# Patient Record
Sex: MALE | Race: WHITE
[De-identification: names, ages, dates, MRNs, and addresses within clinical notes are randomized per-mention and may not be internally consistent; named-entity substitution may affect disease eponyms.]

---

## 2019-06-04 ENCOUNTER — HOSPITAL ENCOUNTER (EMERGENCY)
Dept: HOSPITAL 25 - ED | Age: 29
LOS: 1 days | Discharge: HOME | End: 2019-06-05
Payer: COMMERCIAL

## 2019-06-04 VITALS — DIASTOLIC BLOOD PRESSURE: 64 MMHG | SYSTOLIC BLOOD PRESSURE: 117 MMHG

## 2019-06-04 DIAGNOSIS — N40.0: ICD-10-CM

## 2019-06-04 DIAGNOSIS — R50.9: ICD-10-CM

## 2019-06-04 DIAGNOSIS — N41.0: Primary | ICD-10-CM

## 2019-06-04 DIAGNOSIS — Z79.899: ICD-10-CM

## 2019-06-04 LAB
ALBUMIN SERPL BCG-MCNC: 4.7 G/DL (ref 3.2–5.2)
ALBUMIN/GLOB SERPL: 1.4 {RATIO} (ref 1–3)
ALP SERPL-CCNC: 55 U/L (ref 34–104)
ALT SERPL W P-5'-P-CCNC: 15 U/L (ref 7–52)
ANION GAP SERPL CALC-SCNC: 13 MMOL/L (ref 2–11)
APTT PPP: 32.6 SECONDS (ref 26–38)
AST SERPL-CCNC: 14 U/L (ref 13–39)
BASOPHILS # BLD AUTO: 0 10^3/UL (ref 0–0.2)
BUN SERPL-MCNC: 12 MG/DL (ref 6–24)
BUN/CREAT SERPL: 13.2 (ref 8–20)
CALCIUM SERPL-MCNC: 10.1 MG/DL (ref 8.6–10.3)
CHLORIDE SERPL-SCNC: 102 MMOL/L (ref 101–111)
CK SERPL-CCNC: 45 U/L (ref 10–223)
EOSINOPHIL # BLD AUTO: 0 10^3/UL (ref 0–0.6)
GLOBULIN SER CALC-MCNC: 3.3 G/DL (ref 2–4)
GLUCOSE SERPL-MCNC: 91 MG/DL (ref 70–100)
HCO3 SERPL-SCNC: 21 MMOL/L (ref 22–32)
HCT VFR BLD AUTO: 46 % (ref 42–52)
HGB BLD-MCNC: 16.1 G/DL (ref 14–18)
INR PPP/BLD: 1.12 (ref 0.82–1.09)
LYMPHOCYTES # BLD AUTO: 1.1 10^3/UL (ref 1–4.8)
MCH RBC QN AUTO: 31 PG (ref 27–31)
MCHC RBC AUTO-ENTMCNC: 35 G/DL (ref 31–36)
MCV RBC AUTO: 90 FL (ref 80–94)
MONOCYTES # BLD AUTO: 1.8 10^3/UL (ref 0–0.8)
NEUTROPHILS # BLD AUTO: 13.9 10^3/UL (ref 1.5–7.7)
NRBC # BLD AUTO: 0 10^3/UL
NRBC BLD QL AUTO: 0
PLATELET # BLD AUTO: 163 10^3/UL (ref 150–450)
POTASSIUM SERPL-SCNC: 3.4 MMOL/L (ref 3.5–5)
PROT SERPL-MCNC: 8 G/DL (ref 6.4–8.9)
RBC # BLD AUTO: 5.16 10^6 /UL (ref 4.18–5.48)
SODIUM SERPL-SCNC: 136 MMOL/L (ref 135–145)
WBC # BLD AUTO: 16.8 10^3/UL (ref 3.5–10.8)
WBC UR QL AUTO: (no result)

## 2019-06-04 PROCEDURE — 87040 BLOOD CULTURE FOR BACTERIA: CPT

## 2019-06-04 PROCEDURE — 96361 HYDRATE IV INFUSION ADD-ON: CPT

## 2019-06-04 PROCEDURE — 96365 THER/PROPH/DIAG IV INF INIT: CPT

## 2019-06-04 PROCEDURE — 85610 PROTHROMBIN TIME: CPT

## 2019-06-04 PROCEDURE — 82550 ASSAY OF CK (CPK): CPT

## 2019-06-04 PROCEDURE — 76870 US EXAM SCROTUM: CPT

## 2019-06-04 PROCEDURE — 96375 TX/PRO/DX INJ NEW DRUG ADDON: CPT

## 2019-06-04 PROCEDURE — 86140 C-REACTIVE PROTEIN: CPT

## 2019-06-04 PROCEDURE — 85730 THROMBOPLASTIN TIME PARTIAL: CPT

## 2019-06-04 PROCEDURE — 80053 COMPREHEN METABOLIC PANEL: CPT

## 2019-06-04 PROCEDURE — 85652 RBC SED RATE AUTOMATED: CPT

## 2019-06-04 PROCEDURE — 96367 TX/PROPH/DG ADDL SEQ IV INF: CPT

## 2019-06-04 PROCEDURE — 81003 URINALYSIS AUTO W/O SCOPE: CPT

## 2019-06-04 PROCEDURE — 76770 US EXAM ABDO BACK WALL COMP: CPT

## 2019-06-04 PROCEDURE — 81015 MICROSCOPIC EXAM OF URINE: CPT

## 2019-06-04 PROCEDURE — 83605 ASSAY OF LACTIC ACID: CPT

## 2019-06-04 PROCEDURE — 99284 EMERGENCY DEPT VISIT MOD MDM: CPT

## 2019-06-04 PROCEDURE — 36415 COLL VENOUS BLD VENIPUNCTURE: CPT

## 2019-06-04 PROCEDURE — 85025 COMPLETE CBC W/AUTO DIFF WBC: CPT

## 2019-06-04 PROCEDURE — 96366 THER/PROPH/DIAG IV INF ADDON: CPT

## 2019-06-04 PROCEDURE — 87086 URINE CULTURE/COLONY COUNT: CPT

## 2019-06-04 NOTE — ED
Progress





- Progress Note


Progress Note: 


Pt is a sign out to Dr. Shelby from Dr. Lopez on the night shift 7pm - 7am on 6 /4/19 - 6/5/19 pending observation with IV antibiotics. Pt will have an ABX 

change from Macrobid to Cipro or Bactrim with a Dx of prostatitis and 

instructions to follow up with Dr. Salazar at 8 am 6/5/19. 








Re-Evaluation





- Re-Evaluation


  ** First Eval


Re-Evaluation Time: 22:55


Change: Unchanged


Comment: Low back pain is controlled except reports new right testicle pain. Pt 

feels warm, but repeat temp is still 99 temporal.  Abd soft, nontender. Mother 

with pt, given results of ultrasound, and advised of Dr. Salazar's consult advice.





Course/Dx





- Course


Course Of Treatment: The pt was a sign out from Dr. Lopez to Dr. Shelby due to 

a pending testicular US. The Pt received a testicular US which showed the right 

epididymis appears grossly unremarkable but there is slight hypervascularity in 

the right epididymal head, cannot exclude mild right epididymitis. The pt will 

be discharged home with the following Dx: Prostatitis, Flank pain, and enlarged 

prostate. The pt will be informed to follow up with his PCP within 3 days and 

to return to the ED with any new or worsening symptoms.





- Diagnoses


Provider Diagnoses: 


 Enlarged prostate, Flank pain, Prostatitis, acute, Fever








Discharge





- Sign-Out/Discharge


Documenting (check all that apply): Patient Departure - discharge, Receiving 

Sign-Out


Receiving patient FROM: Dory Lopez


Patient Received Moderate/Deep Sedation with Procedure: No





- Discharge Plan


Condition: Stable


Disposition: HOME


Prescriptions: 


Sulfamethox/Trimethoprim DS* [Bactrim /160 TAB*] 1 tab PO BID #20 tab


Patient Education Materials:  Prostatitis (ED), Flank Pain (ED)


Referrals: 


Josh Salazar MD [Medical Doctor] - 1 Day


Additional Instructions: 


RETURN TO THE EMERGENCY ROOM WITH ANY NEW OR WORSENING SYMPTOMS. PLEASE CALL 

DR. SALAZAR TOMORROW AT 8 AM TO SCHEDULE AN APPOINTMENT. 





- Billing Disposition and Condition


Condition: STABLE


Disposition: Home





- Attestation Statements


Document Initiated by Scribe: Yes


Documenting Scribe: Jose David Orr


Provider For Whom Scribe is Documenting (Include Credential): Travis Shelby MD


Scribe Attestation: 


I, Jose David Orr, scribed for Travis Shelby MD on 06/05/19 at 0510. 


Scribe Documentation Reviewed: Yes


Provider Attestation: 


The documentation as recorded by the Jose David mccormick accurately reflects 

the service I personally performed and the decisions made by me, Travis Shelby MD


Status of Scribe Document: Viewed

## 2019-06-04 NOTE — ED
Back Pain





- HPI Summary


HPI Summary: 





Pt is a 28 y/o M presenting to the ED with a chief complaint of back pain on 

both sides of his back. He states he came down with a fever on 5/30/19, and 

then the next day developed into feeling very fatigued. On 6/1 and 6/2, he had 

some difficulty urinating, described as burning and some pain, accompanied by 

dizziness and his fever. The past couple of months, he reports he has been sick 

around 4-5 times. On 6/3, he went to [facility] and received abx to treat a 

UTI, (macrobid) took one dose on the evening of 6/3 and one dose in the morning 

of 6/4, but reports that he feels much worse today and that the bilateral flank/

low back pain is new today compared to yesterday. He also took 400mg ibuprofen 

this morning around 0800 with some relief. 


He reports headache, fever, chills, photophobia, diaphoresis, burning with 

urination, and dysuria. He denies hematuria, cough, phlegm, dizziness, ear ache

, sore throat, or rash. He denies hx of kidney stones, but it is in his FHx. He 

also denies one-sided back pain or hx of UTI.








- History of Current Complaint


Chief Complaint: EDFlankPain


Stated Complaint: POSSIBLE UTI PER [facility]


Time Seen by Provider: 06/04/19 16:46


Hx Obtained From: Patient, Family/Caretaker - mother with pt


Onset/Duration: Gradual Onset, Lasting Days, Still Present


Onset/Duration: Started Days Ago, Still Present


Timing: Constant, Lasting Days


Back Pain Location: Is Diffuse - bilateral low back and bilateral flank pain


Severity Initially: Mild


Severity Currently: Moderate


Pain Intensity: 3


Pain Scale Used: 0-10 Numeric


Character: Aching


Aggravating Symptom(s): Nothing


Alleviating Symptom(s): Nothing


Associated Signs And Symptoms: Positive: Fever, Other - dysuria





- Allergies/Home Medications


Allergies/Adverse Reactions: 


 Allergies











Allergy/AdvReac Type Severity Reaction Status Date / Time


 


No Known Allergies Allergy   Verified 06/04/19 14:56











Home Medications: 


 Home Medications





Nitrofurantoin Macrocrystals* [Macrodantin 100 mg*] 100 mg PO BID 06/04/19 [

History Confirmed 06/04/19]











PMH/Surg Hx/FS Hx/Imm Hx


Previously Healthy: Yes


Endocrine/Hematology History: 


   Denies: Hx Diabetes


 History: 


   Denies: Hx Kidney Infection, Hx Kidney Stones





- Surgical History


Surgery Procedure, Year, and Place: none


Infectious Disease History: No


Infectious Disease History: 


   Denies: Traveled Outside the [place] in Last 30 Days





- Family History


Known Family History: Positive: Other - kidney stones





- Social History


Occupation: Student


Alcohol Use: None


Hx Substance Use: No


Substance Use Type: Reports: None


Hx Tobacco Use: No





Review of Systems


Positive: Fever, Chills, Skin Diaphoresis


Positive: Photophobia


Negative: Sore Throat, Ear Ache


Cardiovascular: Negative


Negative: Cough


Gastrointestinal: Negative


Positive: burning, dysuria.  Negative: hematuria


Positive: Myalgia - back pain.  Negative: Other - one-sided back pain; the back 

pain he has is bilateral


Negative: Rash


Neurological: Negative - dizziness


Positive: Headache


Psychological: Normal


All Other Systems Reviewed And Are Negative: Yes





Physical Exam





- Summary


Physical Exam Summary: 





Appearance: Ill-appearing, moderate pain distress, well-nourished


Skin: Warm, color reflects adequate perfusion, diaphoretic


Head: Normal Head/Face inspection, atraumatic


Eyes: Conjunctiva clear


ENT: Normal inspection


Neck: Supple, no nodes, no JVD


Respiratory: Lungs clear, normal breath sounds, no respiratory distress


Cardio: RRR, No murmur, pulses normal, brisk capillary refill


Abdomen: Soft, nontender


: with nurse Leonard as chaperone: uncircumcised male, right testicle tender, 

no masses, no redness, left testicle nontender


Bowel sounds: Present 


Musculoskeletal: Strength Intact/ROM intact, no calf tenderness, no edema.


Psychological: Normal


Neuro: Alert, muscle tone normal, no focal deficit





Triage Information Reviewed: Yes


Vital Signs On Initial Exam: 


 Initial Vitals











Temp Pulse Resp BP Pulse Ox


 


 100.9 F   103   16   140/82   99 


 


 06/04/19 14:52  06/04/19 14:52  06/04/19 14:52  06/04/19 14:52  06/04/19 14:52











Vital Signs Reviewed: Yes





Diagnostics





- Vital Signs


 Vital Signs











  Temp Pulse Resp BP Pulse Ox


 


 06/04/19 16:40  100.1 F  96  20  121/69  100


 


 06/04/19 14:52  100.9 F  103  16  140/82  99














- Laboratory


Result Diagrams: 


 06/04/19 17:38





 06/04/19 17:38


Lab Statement: Any lab studies that have been ordered have been reviewed, and 

results considered in the medical decision making process.





- Ultrasound


  ** No standard instances


Ultrasound Interpretation Completed By: Radiologist


Summary of Ultrasound Findings: Renal US shows:  1. The prostate gland is 

enlarged measuring 57.5 cc. 2. No renal calculi or hydronephrosis.  ED 

physician has reviewed this report.





Re-Evaluation





- Re-Evaluation


  ** First Eval


Change: Unchanged


Comment: Low back pain is controlled except reports new right testicle pain. Pt 

feels warm, but repeat temp is still 99 temporal.  Abd soft, nontender. Mother 

with pt, given results of ultrasound, and advised of Dr. Salazar's consult advice.





Back Pain Course/Dx





- Course


Course Of Treatment: Pt is a 28 y/o M presenting to the ED with a chief 

complaint of back pain on both sides of his back. The past couple of months, he 

reports he has been sick around 4-5 times but this is first urinary illness. On 

6/3, he went to [facility] and received abx for UTI (Macrobid), took one 

dose on the evening of 6/3 and one dose in the morning of 6/4, but reports that 

he feels much worse today with the new bilateral low back pain and bilateral 

flank pain.  He reports headache, fever, chills, photophobia, diaphoresis, 

burning with urination, and dysuria. He denies hematuria, cough, phlegm, 

dizziness, ear ache, sore throat, or rash. He denies hx of kidney stones, but 

it is in his FHx. He also denies one-sided back pain or hx of UTI.  Renal US 

shows: 1. The prostate gland is enlarged measuring 57.5 cc. 2. No renal calculi 

or hydronephrosis.  I spoke with Dr. Salazar about the pt's present condition. 

and he recommends ceftriaxone 2gms IV, gentamicin 160mg IV and 2 liters NS more 

and observation for at least 2 hrs more, to ensure that pt's fever is not 

rising. Pt should see Dr. Salazar tomorrow, and call the office in the am, or if 

pt is admitted, Dr. Salazar will see pt in the hospital.  Hospitalists should 

admit pt, if pt requires admission, and urology will consult.  Dr. Salazar states 

to change pt's antibiotics to something that he is not already taking.





- Diagnoses


Differential Diagnosis/HQI/PQRI: Positive: Renal Colic, Other - pyelonephritis


Provider Diagnoses: 


 Enlarged prostate, Flank pain, Prostatitis, acute, Fever








Discharge





- Sign-Out/Discharge


Documenting (check all that apply): Patient Departure


Signing out patient TO: Travis Shelby - awaiting response to antibiotics and 

fluids, and testicular US at 1900 6/4/19


Patient Received Moderate/Deep Sedation with Procedure: No





- Discharge Plan


Condition: Stable


Disposition: HOME


Patient Education Materials:  Prostatitis (ED), Flank Pain (ED)


Referrals: 


MARK Tomas [Primary Care Provider] - 


Additional Instructions: 


RETURN TO THE EMERGENCY ROOM WITH ANY NEW OR WORSENING SYMPTOMS. PLEASE CALL 

DR. SALAZAR TOMORROW AT 8 AM TO SCHEDULE AN APPOINTMENT. 





- Billing Disposition and Condition


Condition: STABLE


Disposition: Home





- Attestation Statements


Document Initiated by Scribe: Yes


Documenting Scribe: Kathryn Blue


Provider For Whom Scribe is Documenting (Include Credential): Dr. Dory Lopez MD.


Scribe Attestation: 


I, Kathryn Blue, scribed for Dr. Dory Lopez MD. on 06/04/19 at 

2223. 


Scribe Documentation Reviewed: Yes


Provider Attestation: 


The documentation as recorded by the scribe, Kathryn Blue accurately 

reflects the service I personally performed and the decisions made by me, Dr. Dory Lopez MD.


Status of Scribe Document: Viewed





Consult


Consult: 





1910 - I spoke with Dr. Salazar about the pt's present condition.